# Patient Record
Sex: MALE | Race: OTHER | HISPANIC OR LATINO | URBAN - METROPOLITAN AREA
[De-identification: names, ages, dates, MRNs, and addresses within clinical notes are randomized per-mention and may not be internally consistent; named-entity substitution may affect disease eponyms.]

---

## 2020-04-01 ENCOUNTER — EMERGENCY (EMERGENCY)
Facility: HOSPITAL | Age: 45
LOS: 1 days | Discharge: HOME | End: 2020-04-01
Payer: MEDICAID

## 2020-04-01 VITALS
HEART RATE: 89 BPM | TEMPERATURE: 101 F | SYSTOLIC BLOOD PRESSURE: 127 MMHG | OXYGEN SATURATION: 95 % | DIASTOLIC BLOOD PRESSURE: 90 MMHG | RESPIRATION RATE: 19 BRPM | HEIGHT: 71 IN | WEIGHT: 214.95 LBS

## 2020-04-01 VITALS
TEMPERATURE: 100 F | DIASTOLIC BLOOD PRESSURE: 75 MMHG | RESPIRATION RATE: 22 BRPM | SYSTOLIC BLOOD PRESSURE: 112 MMHG | OXYGEN SATURATION: 96 % | HEART RATE: 78 BPM

## 2020-04-01 PROCEDURE — 99283 EMERGENCY DEPT VISIT LOW MDM: CPT | Mod: 25

## 2020-04-01 PROCEDURE — 71045 X-RAY EXAM CHEST 1 VIEW: CPT

## 2020-04-01 PROCEDURE — 71045 X-RAY EXAM CHEST 1 VIEW: CPT | Mod: 26

## 2020-04-01 PROCEDURE — 99284 EMERGENCY DEPT VISIT MOD MDM: CPT

## 2020-04-01 RX ORDER — ACETAMINOPHEN 500 MG
975 TABLET ORAL ONCE
Refills: 0 | Status: COMPLETED | OUTPATIENT
Start: 2020-04-01 | End: 2020-04-01

## 2020-04-01 RX ADMIN — Medication 975 MILLIGRAM(S): at 18:45

## 2020-04-01 NOTE — ED PROVIDER NOTE - OBJECTIVE STATEMENT
45 y/o M with no significant pmhx presents with x2 weeks of fever, cough, and dizziness. States have tried Tylenol with no improvement. Began to have diarrhea and nausea today but no vomiting or abd pain. Came to ED for further evaluation. Denies sob, loc, or palpitation. Pt is able to tolerate fluids and PO intake.

## 2020-04-01 NOTE — ED PROVIDER NOTE - PHYSICAL EXAMINATION
GEN: NAD, awake, eyes open spontaneously  HEENT: NCAT, MMM, Trachea midline, normal conjunctiva, perrl  CHEST/LUNGS: Slight rales in left lower lung base   CARDIAC: Non-tachycardic, normal perfusion  ABDOMEN: Soft, NTND, No rebound/guarding  MSK: No edema, no gross deformity of extremities  SKIN: No rashes, no petechiae, no vesicles  NEURO: CN grossly intact, normal coordination, no focal motor or sensory deficits  PSYCH: Alert, appropriate, cooperative, with capacity and insight

## 2020-04-01 NOTE — ED ADULT NURSE NOTE - OBJECTIVE STATEMENT
44y male with hx of back sx presents to the ER c/o fevers x 2 weeks and nausea/vomiting. pt is alert and oriented x 4 and speaking coherently. pt states he has had fevers x 2 weeks, as well as dizziness while walking. pt states he vomited before coming to the ER. PT in nad, vss. EDMOND carrillo completed. pending md carrillo. will reassess.

## 2020-04-01 NOTE — ED PROVIDER NOTE - ATTENDING CONTRIBUTION TO CARE
I performed a history and physical exam of the patient and discussed their management with the resident/ACP/medical or PA student. I reviewed the resident/ACP/medical or PA student's note and agree with the documented findings and plan of care. I, Dr. Suman RICHARD, performed a history and physical exam of the patient and discussed their management with the resident/ACP/medical or PA student. I reviewed the resident/ACP/medical or PA student's note and agree with the documented findings and plan of care.

## 2020-04-01 NOTE — ED PROVIDER NOTE - PATIENT PORTAL LINK FT
You can access the FollowMyHealth Patient Portal offered by [facility] by registering at the following website: [email or web address]. By joining [facility]’s FollowMyHealth portal, you will also be able to view your health information using other applications (apps) compatible with our system.

## 2020-04-01 NOTE — ED PROVIDER NOTE - NSFOLLOWUPINSTRUCTIONS_ED_ALL_ED_FT
You were seen and evaluated in the Emergency Department for your viral upper respiratory-like symptoms. You were evaluated clinically at this time your symptoms, history, and presentation suggests that you are at high-risk for Corona Virus (COVID).  Please see the attached COVID information packet for management of your symptoms as well as numbers to call for your questions and more information on the next steps including your self-quarantine measures.     At this time your clinical evaluation and history do not demonstrate any acute, life-threatening medical conditions warranting emergent treatment. However, we strongly recommend you follow up with one of our Primary Care Doctors (or your own) for further evaluation of your symptoms by calling the following number to make an appointment:    [facility] Internal Medicine  General Internal Medicine  [address] [place] [address]  Phone: [phone]    Continue to maintain oral hydration, with plenty of fluids; take Tylenol and Motrin, every 8 hours with food (following the instructions on the medication insert information sheet for dosing) for fever control.     Should you develop new or worsening chest pain, shortness of breath, abdominal pain, fevers, chills, nausea, vomiting, diarrhea, or constipation - please return to the ED for immediate evaluation.     We also strongly encourage you make an appointment with your Primary Care Physician for a comprehensive evaluation of your health.

## 2020-04-01 NOTE — ED PROVIDER NOTE - PROGRESS NOTE DETAILS
Walked pt throughout the unit, was 96% on room air without any respiratory distress.  Pt is also tolerating PO in the ED.  Mk

## 2020-04-01 NOTE — ED PROVIDER NOTE - CLINICAL SUMMARY MEDICAL DECISION MAKING FREE TEXT BOX
healthy 43 y/o M presents with viral sx consistent with COVID-19. Will check cxr for focal pna due to longing of sx. If negative, will discharge home with COVID precautions.

## 2020-04-01 NOTE — ED PROVIDER NOTE - NSFOLLOWUPCLINICS_GEN_ALL_ED_FT
[facility] General Internal Medicine  General Internal Medicine  [address] [id number]  Phone: [phone]  Fax:   Follow Up Time:

## 2025-02-05 NOTE — ED PROVIDER NOTE - RESPIRATORY [+], MLM
Detail Level: Zone
Continue Regimen: Nystatin powder: apply to folds under abdomen 2-3x daily as needed for flares
Render In Strict Bullet Format?: No
COUGH